# Patient Record
Sex: MALE | Race: WHITE
[De-identification: names, ages, dates, MRNs, and addresses within clinical notes are randomized per-mention and may not be internally consistent; named-entity substitution may affect disease eponyms.]

---

## 2018-01-02 ENCOUNTER — HOSPITAL ENCOUNTER (OUTPATIENT)
Dept: HOSPITAL 89 - ZZLCC | Age: 83
End: 2018-01-02
Attending: FAMILY MEDICINE
Payer: MEDICARE

## 2018-01-02 DIAGNOSIS — R42: Primary | ICD-10-CM

## 2018-01-02 LAB — PLATELET COUNT, AUTOMATED: 194 K/UL (ref 150–450)

## 2018-01-02 PROCEDURE — 82040 ASSAY OF SERUM ALBUMIN: CPT

## 2018-01-02 PROCEDURE — 84460 ALANINE AMINO (ALT) (SGPT): CPT

## 2018-01-02 PROCEDURE — 84443 ASSAY THYROID STIM HORMONE: CPT

## 2018-01-02 PROCEDURE — 84155 ASSAY OF PROTEIN SERUM: CPT

## 2018-01-02 PROCEDURE — 82247 BILIRUBIN TOTAL: CPT

## 2018-01-02 PROCEDURE — 82310 ASSAY OF CALCIUM: CPT

## 2018-01-02 PROCEDURE — 85027 COMPLETE CBC AUTOMATED: CPT

## 2018-01-02 PROCEDURE — 84295 ASSAY OF SERUM SODIUM: CPT

## 2018-01-02 PROCEDURE — 84075 ASSAY ALKALINE PHOSPHATASE: CPT

## 2018-01-02 PROCEDURE — 82565 ASSAY OF CREATININE: CPT

## 2018-01-02 PROCEDURE — 82374 ASSAY BLOOD CARBON DIOXIDE: CPT

## 2018-01-02 PROCEDURE — 84450 TRANSFERASE (AST) (SGOT): CPT

## 2018-01-02 PROCEDURE — 84520 ASSAY OF UREA NITROGEN: CPT

## 2018-01-02 PROCEDURE — 82435 ASSAY OF BLOOD CHLORIDE: CPT

## 2018-01-02 PROCEDURE — 84132 ASSAY OF SERUM POTASSIUM: CPT

## 2018-01-02 PROCEDURE — 82947 ASSAY GLUCOSE BLOOD QUANT: CPT

## 2018-01-17 ENCOUNTER — HOSPITAL ENCOUNTER (OUTPATIENT)
Dept: HOSPITAL 89 - ZZLCC | Age: 83
End: 2018-01-17
Attending: FAMILY MEDICINE
Payer: MEDICARE

## 2018-01-17 DIAGNOSIS — N39.0: Primary | ICD-10-CM

## 2018-01-17 LAB — PLATELET COUNT, AUTOMATED: 172 K/UL (ref 150–450)

## 2018-01-17 PROCEDURE — 82947 ASSAY GLUCOSE BLOOD QUANT: CPT

## 2018-01-17 PROCEDURE — 82040 ASSAY OF SERUM ALBUMIN: CPT

## 2018-01-17 PROCEDURE — 82374 ASSAY BLOOD CARBON DIOXIDE: CPT

## 2018-01-17 PROCEDURE — 84450 TRANSFERASE (AST) (SGOT): CPT

## 2018-01-17 PROCEDURE — 84075 ASSAY ALKALINE PHOSPHATASE: CPT

## 2018-01-17 PROCEDURE — 82565 ASSAY OF CREATININE: CPT

## 2018-01-17 PROCEDURE — 84460 ALANINE AMINO (ALT) (SGPT): CPT

## 2018-01-17 PROCEDURE — 84295 ASSAY OF SERUM SODIUM: CPT

## 2018-01-17 PROCEDURE — 84132 ASSAY OF SERUM POTASSIUM: CPT

## 2018-01-17 PROCEDURE — 82247 BILIRUBIN TOTAL: CPT

## 2018-01-17 PROCEDURE — 82435 ASSAY OF BLOOD CHLORIDE: CPT

## 2018-01-17 PROCEDURE — 84520 ASSAY OF UREA NITROGEN: CPT

## 2018-01-17 PROCEDURE — 84155 ASSAY OF PROTEIN SERUM: CPT

## 2018-01-17 PROCEDURE — 82310 ASSAY OF CALCIUM: CPT

## 2018-01-17 PROCEDURE — 85027 COMPLETE CBC AUTOMATED: CPT

## 2018-03-29 ENCOUNTER — HOSPITAL ENCOUNTER (OUTPATIENT)
Dept: HOSPITAL 89 - AMB | Age: 83
End: 2018-03-29
Payer: MEDICARE

## 2018-03-29 ENCOUNTER — HOSPITAL ENCOUNTER (EMERGENCY)
Dept: HOSPITAL 89 - ER | Age: 83
Discharge: HOME | End: 2018-03-29
Payer: MEDICARE

## 2018-03-29 VITALS — DIASTOLIC BLOOD PRESSURE: 89 MMHG | SYSTOLIC BLOOD PRESSURE: 132 MMHG
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
## 2018-03-29 DIAGNOSIS — R53.1: Primary | ICD-10-CM

## 2018-03-29 DIAGNOSIS — I95.9: ICD-10-CM

## 2018-03-29 DIAGNOSIS — R04.0: Primary | ICD-10-CM
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
## 2018-03-29 DIAGNOSIS — F03.90: ICD-10-CM
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
## 2018-03-29 PROCEDURE — 99281 EMR DPT VST MAYX REQ PHY/QHP: CPT

## 2018-03-29 NOTE — ER REPORT
History and Physical


Time Seen By MD:  00:34


HPI/ROS


CHIEF COMPLAINT: Fall





HISTORY OF PRESENT ILLNESS: 83-year-old male brought from [facility] 

after ground-level fall.  Patient denies any injuries.  On arrival.  He states 

he caught himself so he didn't fall hard.  There is some crusted blood at the 

end of his nose and on his hands.  He has movement of all extremities.  Patient 

has dementia.  Patient is DO NOT RESUSCITATE.





REVIEW OF SYSTEMS:


Respiratory: No cough, no dyspnea.


Cardiovascular: No chest pain, no palpitations.


Gastrointestinal: No vomiting, no abdominal pain.


Musculoskeletal: No back pain.


Allergies:  


Coded Allergies:  


     No Known Allergies (Verified  Allergy, Unknown, 8/7/11)


Home Meds


Reported Medications


Aspirin (ASPIR 81) 81 Mg Tablet.dr, 1 TAB PO QDAY, TAB


   12/4/17


Olanzapine (Zyprexa) 2.5 Mg Tablet, 2.5 MG PO HS, #30 0 Refills


   8/15/11


Memantine Hcl (Namenda) 10 Mg Tablet, 10 MG PO BID, #30 0 Refills


   8/15/11


Donepezil Hcl (Aricept) 10 Mg Tablet, 10 MG PO BID, #30 0 Refills


   8/15/11


Amlodipine Besylate (Amlodipine Besylate) 10 Mg Tablet, 10 MG PO DAILY for 30 

Days, 0 Refills


   8/15/11


Hx Smoking:  No


Hx Alcohol Use:  No


Constitutional





Vital Sign - Last 24 Hours








 3/29/18





 00:38


 


Temp 97.8


 


Pulse 65


 


Resp 19


 


B/P (MAP) 132/89


 


Pulse Ox 95


 


O2 Delivery Room Air








Physical Exam


General Appearance: The patient is alert, has no immediate need for airway 

protection and no current signs of toxicity.  Palpation of the head and neck 

reveal no tenderness or trauma


HEENT: Pupils equal and round no injection.  There is some crusted blood in the 

right nares.  There is no dov bleeding.  There is no nasal tenderness.  

Oropharynx is without dental trauma


Respiratory: Chest is non tender, lungs are clear to auscultation.  No chest 

wall tenderness


Cardiac: regular rate and rhythm


Gastrointestinal: Abdomen is soft and non tender, no masses, bowel sounds 

normal.


Musculoskeletal:  Neck: Neck is supple and non tender.


Extremities have full range of motion and are non tender.


Skin: No rashes or lesions.





DIFFERENTIAL DIAGNOSIS: After history and physical exam differential diagnosis 

was considered for fall in the elderly including but not limited to 

intracranial injury, long bone and pelvic bone fracture, spinal injury, and 

intrathoracic injury.





Medical Decision Making


ED Course/Re-evaluation


ED Course


Patient was admitted to an examination room.  H&P was done.  The differential 

diagnoses was considered.  On clinical examination.  Patient has no obvious 

signs of injuries.  He voices no complaints.  He seems to be good spirits.  He 

is alert and oriented.  Nursing home noted that he might be hypotensive.  EMS 

as documented 2 good blood pressures in route.  His blood pressure here is 

normal.  I see no indication for diagnostic tests at this time.  Patient be 

returned to the nursing home.


Decision to Disposition Date:  Mar 29, 2018


Decision to Disposition Time:  00:40





Depart


Departure


Latest Vital Signs





Vital Signs








  Date Time  Temp Pulse Resp B/P (MAP) Pulse Ox O2 Delivery O2 Flow Rate FiO2


 


3/29/18 00:38 97.8 65 19 132/89 95 Room Air  








Impression:  


 Primary Impression:  


 Fall


 Additional Impressions:  


 Epistaxis


 Dementia


 Do not resuscitate


Condition:  Improved


Disposition:  HOME OR SELF-CARE


Patient Instructions:  GENERAL ER DISCHARGE INSTRUCTIONS





Additional Instructions:  


Return to nursing home, resume previous care





Problem Qualifiers








 Primary Impression:  


 Fall


 Encounter type:  initial encounter  Qualified Codes:  W19.XXXA - Unspecified 

fall, initial encounter


 Additional Impressions:  


 Dementia


 Dementia type:  unspecified type  Dementia behavioral disturbance:  without 

behavioral disturbance  Qualified Codes:  F03.90 - Unspecified dementia without 

behavioral disturbance








MIKAYLA PIZARRO DO Mar 29, 2018 00:42

## 2018-05-27 ENCOUNTER — HOSPITAL ENCOUNTER (OUTPATIENT)
Dept: HOSPITAL 89 - ZZLCC | Age: 83
End: 2018-05-27
Attending: FAMILY MEDICINE
Payer: MEDICARE

## 2018-05-27 DIAGNOSIS — R50.9: Primary | ICD-10-CM

## 2018-05-27 DIAGNOSIS — R52: ICD-10-CM

## 2018-05-27 LAB — PLATELET COUNT, AUTOMATED: 149 K/UL (ref 150–450)

## 2018-05-27 PROCEDURE — 82310 ASSAY OF CALCIUM: CPT

## 2018-05-27 PROCEDURE — 84155 ASSAY OF PROTEIN SERUM: CPT

## 2018-05-27 PROCEDURE — 84460 ALANINE AMINO (ALT) (SGPT): CPT

## 2018-05-27 PROCEDURE — 82435 ASSAY OF BLOOD CHLORIDE: CPT

## 2018-05-27 PROCEDURE — 82247 BILIRUBIN TOTAL: CPT

## 2018-05-27 PROCEDURE — 84295 ASSAY OF SERUM SODIUM: CPT

## 2018-05-27 PROCEDURE — 82565 ASSAY OF CREATININE: CPT

## 2018-05-27 PROCEDURE — 82947 ASSAY GLUCOSE BLOOD QUANT: CPT

## 2018-05-27 PROCEDURE — 85025 COMPLETE CBC W/AUTO DIFF WBC: CPT

## 2018-05-27 PROCEDURE — 82374 ASSAY BLOOD CARBON DIOXIDE: CPT

## 2018-05-27 PROCEDURE — 84075 ASSAY ALKALINE PHOSPHATASE: CPT

## 2018-05-27 PROCEDURE — 82040 ASSAY OF SERUM ALBUMIN: CPT

## 2018-05-27 PROCEDURE — 84450 TRANSFERASE (AST) (SGOT): CPT

## 2018-05-27 PROCEDURE — 84132 ASSAY OF SERUM POTASSIUM: CPT

## 2018-05-27 PROCEDURE — 84520 ASSAY OF UREA NITROGEN: CPT

## 2018-06-25 ENCOUNTER — HOSPITAL ENCOUNTER (OUTPATIENT)
Dept: HOSPITAL 89 - ZZLCC | Age: 83
End: 2018-06-25
Attending: FAMILY MEDICINE
Payer: MEDICARE

## 2018-06-25 DIAGNOSIS — D64.9: ICD-10-CM

## 2018-06-25 DIAGNOSIS — M62.81: Primary | ICD-10-CM

## 2018-06-25 DIAGNOSIS — R41.82: ICD-10-CM

## 2018-06-25 DIAGNOSIS — M54.5: ICD-10-CM

## 2018-06-25 LAB — PLATELET COUNT, AUTOMATED: 158 K/UL (ref 150–450)

## 2018-06-25 PROCEDURE — 84295 ASSAY OF SERUM SODIUM: CPT

## 2018-06-25 PROCEDURE — 82435 ASSAY OF BLOOD CHLORIDE: CPT

## 2018-06-25 PROCEDURE — 82565 ASSAY OF CREATININE: CPT

## 2018-06-25 PROCEDURE — 84132 ASSAY OF SERUM POTASSIUM: CPT

## 2018-06-25 PROCEDURE — 84520 ASSAY OF UREA NITROGEN: CPT

## 2018-06-25 PROCEDURE — 84460 ALANINE AMINO (ALT) (SGPT): CPT

## 2018-06-25 PROCEDURE — 82947 ASSAY GLUCOSE BLOOD QUANT: CPT

## 2018-06-25 PROCEDURE — 82040 ASSAY OF SERUM ALBUMIN: CPT

## 2018-06-25 PROCEDURE — 84075 ASSAY ALKALINE PHOSPHATASE: CPT

## 2018-06-25 PROCEDURE — 84450 TRANSFERASE (AST) (SGOT): CPT

## 2018-06-25 PROCEDURE — 82374 ASSAY BLOOD CARBON DIOXIDE: CPT

## 2018-06-25 PROCEDURE — 82310 ASSAY OF CALCIUM: CPT

## 2018-06-25 PROCEDURE — 85027 COMPLETE CBC AUTOMATED: CPT

## 2018-06-25 PROCEDURE — 82247 BILIRUBIN TOTAL: CPT

## 2018-06-25 PROCEDURE — 82607 VITAMIN B-12: CPT

## 2018-06-25 PROCEDURE — 84155 ASSAY OF PROTEIN SERUM: CPT

## 2018-12-21 ENCOUNTER — HOSPITAL ENCOUNTER (OUTPATIENT)
Dept: HOSPITAL 89 - ZZLCC | Age: 83
End: 2018-12-21
Attending: FAMILY MEDICINE
Payer: MEDICARE

## 2018-12-21 DIAGNOSIS — R41.0: Primary | ICD-10-CM

## 2018-12-21 LAB — PLATELET COUNT, AUTOMATED: 85 K/UL (ref 150–450)

## 2018-12-21 PROCEDURE — 82310 ASSAY OF CALCIUM: CPT

## 2018-12-21 PROCEDURE — 82374 ASSAY BLOOD CARBON DIOXIDE: CPT

## 2018-12-21 PROCEDURE — 84295 ASSAY OF SERUM SODIUM: CPT

## 2018-12-21 PROCEDURE — 82947 ASSAY GLUCOSE BLOOD QUANT: CPT

## 2018-12-21 PROCEDURE — 82435 ASSAY OF BLOOD CHLORIDE: CPT

## 2018-12-21 PROCEDURE — 84132 ASSAY OF SERUM POTASSIUM: CPT

## 2018-12-21 PROCEDURE — 82565 ASSAY OF CREATININE: CPT

## 2018-12-21 PROCEDURE — 85027 COMPLETE CBC AUTOMATED: CPT

## 2018-12-21 PROCEDURE — 84520 ASSAY OF UREA NITROGEN: CPT
